# Patient Record
Sex: MALE | Race: WHITE | URBAN - METROPOLITAN AREA
[De-identification: names, ages, dates, MRNs, and addresses within clinical notes are randomized per-mention and may not be internally consistent; named-entity substitution may affect disease eponyms.]

---

## 2020-01-14 ENCOUNTER — EMERGENCY (EMERGENCY)
Facility: HOSPITAL | Age: 65
LOS: 0 days | Discharge: HOME | End: 2020-01-14
Attending: HOSPITALIST
Payer: COMMERCIAL

## 2020-01-14 VITALS
TEMPERATURE: 98 F | SYSTOLIC BLOOD PRESSURE: 162 MMHG | DIASTOLIC BLOOD PRESSURE: 79 MMHG | HEART RATE: 105 BPM | OXYGEN SATURATION: 100 % | WEIGHT: 220.02 LBS | RESPIRATION RATE: 16 BRPM
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
## 2020-01-14 DIAGNOSIS — R51 HEADACHE: ICD-10-CM

## 2020-01-14 DIAGNOSIS — S16.1XXA STRAIN OF MUSCLE, FASCIA AND TENDON AT NECK LEVEL, INITIAL ENCOUNTER: ICD-10-CM

## 2020-01-14 DIAGNOSIS — Z79.82 LONG TERM (CURRENT) USE OF ASPIRIN: ICD-10-CM

## 2020-01-14 DIAGNOSIS — M54.2 CERVICALGIA: ICD-10-CM

## 2020-01-14 PROCEDURE — 70450 CT HEAD/BRAIN W/O DYE: CPT

## 2020-01-14 PROCEDURE — 72125 CT NECK SPINE W/O DYE: CPT | Mod: 26

## 2020-01-14 PROCEDURE — 99283 EMERGENCY DEPT VISIT LOW MDM: CPT

## 2020-01-14 PROCEDURE — 70450 CT HEAD/BRAIN W/O DYE: CPT | Mod: 26

## 2020-01-14 PROCEDURE — 99284 EMERGENCY DEPT VISIT MOD MDM: CPT | Mod: 25

## 2020-01-14 PROCEDURE — 72125 CT NECK SPINE W/O DYE: CPT

## 2020-01-14 RX ORDER — ACETAMINOPHEN 500 MG
650 TABLET ORAL ONCE
Refills: 0 | Status: COMPLETED | OUTPATIENT
Start: 2020-01-14 | End: 2020-01-14

## 2020-01-14 RX ADMIN — Medication 650 MILLIGRAM(S): at 16:36
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
## 2020-01-14 NOTE — ED STATDOCS - NS_ ATTENDINGSCRIBEDETAILS _ED_A_ED_FT
Brynn Lock MD: The history, relevant review of systems, past medical and surgical history, medical decision making, and physical examination was documented by the scribe in my presence and I attest to the accuracy of the documentation.
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
## 2020-01-14 NOTE — ED STATDOCS - PATIENT PORTAL LINK FT
You can access the FollowMyHealth Patient Portal offered by [facility] by registering at the following website: [email or web address]. By joining [employer]’s FollowMyHealth portal, you will also be able to view your health information using other applications (apps) compatible with our system.

## 2023-07-30 ENCOUNTER — EMERGENCY (EMERGENCY)
Facility: HOSPITAL | Age: 68
LOS: 0 days | Discharge: HOME | End: 2023-07-30
Attending: EMERGENCY MEDICINE
Payer: MEDICARE

## 2023-07-30 VITALS — HEIGHT: 68 IN | WEIGHT: 160.06 LBS

## 2023-07-30 VITALS
SYSTOLIC BLOOD PRESSURE: 127 MMHG | OXYGEN SATURATION: 97 % | RESPIRATION RATE: 18 BRPM | DIASTOLIC BLOOD PRESSURE: 93 MMHG | TEMPERATURE: 98 F | HEART RATE: 65 BPM

## 2023-07-30 DIAGNOSIS — R07.89 OTHER CHEST PAIN: ICD-10-CM

## 2023-07-30 DIAGNOSIS — Z79.82 LONG TERM (CURRENT) USE OF ASPIRIN: ICD-10-CM

## 2023-07-30 DIAGNOSIS — I25.2 OLD MYOCARDIAL INFARCTION: ICD-10-CM

## 2023-07-30 DIAGNOSIS — I25.10 ATHEROSCLEROTIC HEART DISEASE OF NATIVE CORONARY ARTERY WITHOUT ANGINA PECTORIS: ICD-10-CM

## 2023-07-30 DIAGNOSIS — Z95.5 PRESENCE OF CORONARY ANGIOPLASTY IMPLANT AND GRAFT: ICD-10-CM

## 2023-07-30 PROBLEM — Z78.9 OTHER SPECIFIED HEALTH STATUS: Chronic | Status: ACTIVE | Noted: 2020-01-24

## 2023-07-30 LAB
ALBUMIN SERPL ELPH-MCNC: 3.8 G/DL — SIGNIFICANT CHANGE UP (ref 3.3–5)
ALP SERPL-CCNC: 61 U/L — SIGNIFICANT CHANGE UP (ref 40–120)
ALT FLD-CCNC: 27 U/L — SIGNIFICANT CHANGE UP (ref 12–78)
ANION GAP SERPL CALC-SCNC: 4 MMOL/L — LOW (ref 5–17)
AST SERPL-CCNC: 21 U/L — SIGNIFICANT CHANGE UP (ref 15–37)
BASOPHILS # BLD AUTO: 0.06 K/UL — SIGNIFICANT CHANGE UP (ref 0–0.2)
BASOPHILS NFR BLD AUTO: 0.5 % — SIGNIFICANT CHANGE UP (ref 0–2)
BILIRUB SERPL-MCNC: 0.8 MG/DL — SIGNIFICANT CHANGE UP (ref 0.2–1.2)
BUN SERPL-MCNC: 14 MG/DL — SIGNIFICANT CHANGE UP (ref 7–23)
CALCIUM SERPL-MCNC: 9.1 MG/DL — SIGNIFICANT CHANGE UP (ref 8.5–10.1)
CHLORIDE SERPL-SCNC: 105 MMOL/L — SIGNIFICANT CHANGE UP (ref 96–108)
CO2 SERPL-SCNC: 29 MMOL/L — SIGNIFICANT CHANGE UP (ref 22–31)
CREAT SERPL-MCNC: 1.17 MG/DL — SIGNIFICANT CHANGE UP (ref 0.5–1.3)
EGFR: 67 ML/MIN/1.73M2 — SIGNIFICANT CHANGE UP
EOSINOPHIL # BLD AUTO: 0.09 K/UL — SIGNIFICANT CHANGE UP (ref 0–0.5)
EOSINOPHIL NFR BLD AUTO: 0.8 % — SIGNIFICANT CHANGE UP (ref 0–6)
GLUCOSE SERPL-MCNC: 175 MG/DL — HIGH (ref 70–99)
HCT VFR BLD CALC: 41.9 % — SIGNIFICANT CHANGE UP (ref 39–50)
HGB BLD-MCNC: 14.1 G/DL — SIGNIFICANT CHANGE UP (ref 13–17)
IMM GRANULOCYTES NFR BLD AUTO: 0.3 % — SIGNIFICANT CHANGE UP (ref 0–0.9)
LYMPHOCYTES # BLD AUTO: 3.7 K/UL — HIGH (ref 1–3.3)
LYMPHOCYTES # BLD AUTO: 31.3 % — SIGNIFICANT CHANGE UP (ref 13–44)
MCHC RBC-ENTMCNC: 31.5 PG — SIGNIFICANT CHANGE UP (ref 27–34)
MCHC RBC-ENTMCNC: 33.7 GM/DL — SIGNIFICANT CHANGE UP (ref 32–36)
MCV RBC AUTO: 93.5 FL — SIGNIFICANT CHANGE UP (ref 80–100)
MONOCYTES # BLD AUTO: 0.98 K/UL — HIGH (ref 0–0.9)
MONOCYTES NFR BLD AUTO: 8.3 % — SIGNIFICANT CHANGE UP (ref 2–14)
NEUTROPHILS # BLD AUTO: 6.97 K/UL — SIGNIFICANT CHANGE UP (ref 1.8–7.4)
NEUTROPHILS NFR BLD AUTO: 58.8 % — SIGNIFICANT CHANGE UP (ref 43–77)
PLATELET # BLD AUTO: 392 K/UL — SIGNIFICANT CHANGE UP (ref 150–400)
POTASSIUM SERPL-MCNC: 4 MMOL/L — SIGNIFICANT CHANGE UP (ref 3.5–5.3)
POTASSIUM SERPL-SCNC: 4 MMOL/L — SIGNIFICANT CHANGE UP (ref 3.5–5.3)
PROT SERPL-MCNC: 7.9 GM/DL — SIGNIFICANT CHANGE UP (ref 6–8.3)
RBC # BLD: 4.48 M/UL — SIGNIFICANT CHANGE UP (ref 4.2–5.8)
RBC # FLD: 13.9 % — SIGNIFICANT CHANGE UP (ref 10.3–14.5)
SODIUM SERPL-SCNC: 138 MMOL/L — SIGNIFICANT CHANGE UP (ref 135–145)
TROPONIN I, HIGH SENSITIVITY RESULT: 4.08 NG/L — SIGNIFICANT CHANGE UP
TROPONIN I, HIGH SENSITIVITY RESULT: 4.3 NG/L — SIGNIFICANT CHANGE UP
WBC # BLD: 11.83 K/UL — HIGH (ref 3.8–10.5)
WBC # FLD AUTO: 11.83 K/UL — HIGH (ref 3.8–10.5)

## 2023-07-30 PROCEDURE — 93010 ELECTROCARDIOGRAM REPORT: CPT

## 2023-07-30 PROCEDURE — 99285 EMERGENCY DEPT VISIT HI MDM: CPT | Mod: 25

## 2023-07-30 PROCEDURE — 71045 X-RAY EXAM CHEST 1 VIEW: CPT | Mod: 26

## 2023-07-30 PROCEDURE — 36415 COLL VENOUS BLD VENIPUNCTURE: CPT

## 2023-07-30 PROCEDURE — 80053 COMPREHEN METABOLIC PANEL: CPT

## 2023-07-30 PROCEDURE — 93005 ELECTROCARDIOGRAM TRACING: CPT

## 2023-07-30 PROCEDURE — 71045 X-RAY EXAM CHEST 1 VIEW: CPT

## 2023-07-30 PROCEDURE — 85025 COMPLETE CBC W/AUTO DIFF WBC: CPT

## 2023-07-30 PROCEDURE — 84484 ASSAY OF TROPONIN QUANT: CPT

## 2023-07-30 PROCEDURE — 99285 EMERGENCY DEPT VISIT HI MDM: CPT | Mod: FS
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
## 2023-07-30 NOTE — ED ADULT NURSE NOTE - NSFALLUNIVINTERV_ED_ALL_ED
Bed/Stretcher in lowest position, wheels locked, appropriate side rails in place/Call bell, personal items and telephone in reach/Instruct patient to call for assistance before getting out of bed/chair/stretcher/Non-slip footwear applied when patient is off stretcher/[place] to call system/Physically safe environment - no spills, clutter or unnecessary equipment/Purposeful proactive rounding/Room/bathroom lighting operational, light cord in reach

## 2023-07-30 NOTE — ED STATDOCS - NSICDXPASTMEDICALHX_GEN_ALL_CORE_FT
PAST MEDICAL HISTORY:  No pertinent past medical history       CAD (coronary artery disease)     S/P coronary artery stent placement

## 2023-07-30 NOTE — ED ADULT TRIAGE NOTE - CHIEF COMPLAINT QUOTE
Pt presented to the ER with c/o chest pain. Pt stated that he had it on and off for the past week. Yesterday pt was walking at the track and it was the worst. Pt has a hx of cardiac stents.

## 2023-07-30 NOTE — ED STATDOCS - ATTENDING APP SHARED VISIT CONTRIBUTION OF CARE
I,Ghulam Mccormick MD,  performed the initial face to face bedside interview with this patient regarding history of present illness, review of symptoms and relevant past medical, social and family history.  I completed an independent physical examination.  I was the initial provider who evaluated this patient. I have signed out the follow up of any pending tests (i.e. labs, radiological studies) to the ACP.  I have communicated the patient’s plan of care and disposition with the ACP.  The history, relevant review of systems, past medical and surgical history, medical decision making, and physical examination was documented by the scribe in my presence and I attest to the accuracy of the documentation.

## 2023-07-30 NOTE — ED STATDOCS - PROGRESS NOTE DETAILS
69 y/o M with PMH of CAD s/p stent x3 presents with 1 week of intermittent chest tightness, worse with exertion, but subsides after apx 1 minute. Pt states he exercises regullarly. has not had cardiac event in 10 years. Denies recent travel. PE: Well appearing. Cardiac: s1s2, RRR. Lungs: CTAB. Abdomen: NBS x4, soft, nontender. PV: NO LE edema, calf tenderness. A/P: r/o ACS. Plan for labs, EKG, CXR, reassess. - Vince Nguyen PA-C

## 2023-07-30 NOTE — ED ADULT NURSE NOTE - OBJECTIVE STATEMENT
presents to ed with chest pain, patient states he had an episode of chest pain one week ago and one episode yesterday. denies chest pain at this time but states he feels something that he cant explain. denies pressure, denies shortness of breath. denies long travel recently. has history of cardiac stents 10 years ago. saw dr. burns last week for general check up. denies other symptoms such as fever

## 2023-07-30 NOTE — ED STATDOCS - PHYSICAL EXAMINATION
Gen: Awake, Alert, WD, WN, NAD  Head:  NC/AT  Eyes:  PERRL, EOMI, Conjunctiva pink, lids normal, no scleral icterus  ENT: OP clear, no exudates, no erythema, uvula midline, TMs clear bilaterally, moist mucus membranes  Neck: supple, nontender, no meningismus, no JVD, trachea midline  Cardiac/CV:  S1 S2, RRR, no M/G/R  Chest: nontender, no crepitus  Respiratory/Pulm:  CTAB, good air movement, normal resp effort, no wheezes/stridor/retractions/rales/rhonchi  Gastrointestinal/Abdomen:  Soft, nontender, nondistended, +BS, no rebound/guarding  Pelvis: stable, nontender, Hips: FROM, nontender  Back:  no CVAT, no MLT  Ext:  warm, well perfused, moving all extremities spontaneously, no cyanosis, no erythema, no edema, distal pulses intact  Skin: intact, no rash, no vesicles, no petechiae, no ecchymosis  Neuro:  AAOx3, sensation intact, motor 5/5 x 4 extremities, normal gait, speech clear

## 2023-07-30 NOTE — ED STATDOCS - PATIENT PORTAL LINK FT
You can access the FollowMyHealth Patient Portal offered by [facility] by registering at the following website: [email or web address]. By joining [employer]’s FollowMyHealth portal, you will also be able to view your health information using other applications (apps) compatible with our system.
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
## 2023-07-30 NOTE — ED STATDOCS - CARE PROVIDER_API CALL
Isaac James  Cardiovascular Disease  [address] [place] [address]  Phone: [phone]  Fax: [phone]  Follow Up Time:

## 2025-05-06 RX ORDER — DOXYCYCLINE HYCLATE 100 MG
1 TABLET ORAL
Qty: 0 | Refills: 0 | DISCHARGE
Start: 2025-05-06 | End: 2025-05-15

## 2025-05-11 ENCOUNTER — INPATIENT (INPATIENT)
Facility: HOSPITAL | Age: 70
LOS: 1 days | Discharge: HOME | DRG: 311 | End: 2025-05-13
Attending: HOSPITALIST | Admitting: STUDENT IN AN ORGANIZED HEALTH CARE EDUCATION/TRAINING PROGRAM
Payer: MEDICARE

## 2025-05-11 VITALS — WEIGHT: 214.95 LBS | HEIGHT: 71 IN

## 2025-05-11 DIAGNOSIS — T82.855A STENOSIS OF CORONARY ARTERY STENT, INITIAL ENCOUNTER: ICD-10-CM

## 2025-05-11 DIAGNOSIS — A69.20 LYME DISEASE, UNSPECIFIED: ICD-10-CM

## 2025-05-11 DIAGNOSIS — E78.5 HYPERLIPIDEMIA, UNSPECIFIED: ICD-10-CM

## 2025-05-11 DIAGNOSIS — I25.119 ATHEROSCLEROTIC HEART DISEASE OF NATIVE CORONARY ARTERY WITH UNSPECIFIED ANGINA PECTORIS: ICD-10-CM

## 2025-05-11 DIAGNOSIS — I24.9 ACUTE ISCHEMIC HEART DISEASE, UNSPECIFIED: ICD-10-CM

## 2025-05-11 DIAGNOSIS — I21.4 NON-ST ELEVATION (NSTEMI) MYOCARDIAL INFARCTION: ICD-10-CM

## 2025-05-11 DIAGNOSIS — Z79.02 LONG TERM (CURRENT) USE OF ANTITHROMBOTICS/ANTIPLATELETS: ICD-10-CM

## 2025-05-11 DIAGNOSIS — I10 ESSENTIAL (PRIMARY) HYPERTENSION: ICD-10-CM

## 2025-05-11 DIAGNOSIS — R73.03 PREDIABETES: ICD-10-CM

## 2025-05-11 DIAGNOSIS — Z95.5 PRESENCE OF CORONARY ANGIOPLASTY IMPLANT AND GRAFT: ICD-10-CM

## 2025-05-11 PROBLEM — I25.10 ATHEROSCLEROTIC HEART DISEASE OF NATIVE CORONARY ARTERY WITHOUT ANGINA PECTORIS: Chronic | Status: ACTIVE | Noted: 2023-07-30

## 2025-05-11 LAB
ALBUMIN SERPL ELPH-MCNC: 4.2 G/DL — SIGNIFICANT CHANGE UP (ref 3.3–5)
ALP SERPL-CCNC: 58 U/L — SIGNIFICANT CHANGE UP (ref 40–120)
ALT FLD-CCNC: 42 U/L — SIGNIFICANT CHANGE UP (ref 12–78)
ANION GAP SERPL CALC-SCNC: 3 MMOL/L — LOW (ref 5–17)
APPEARANCE UR: CLEAR — SIGNIFICANT CHANGE UP
APTT BLD: 29.1 SEC — SIGNIFICANT CHANGE UP (ref 26.1–36.8)
AST SERPL-CCNC: 38 U/L — HIGH (ref 15–37)
BASOPHILS # BLD AUTO: 0.05 K/UL — SIGNIFICANT CHANGE UP (ref 0–0.2)
BASOPHILS NFR BLD AUTO: 0.5 % — SIGNIFICANT CHANGE UP (ref 0–2)
BILIRUB SERPL-MCNC: 0.9 MG/DL — SIGNIFICANT CHANGE UP (ref 0.2–1.2)
BILIRUB UR-MCNC: NEGATIVE — SIGNIFICANT CHANGE UP
BLD GP AB SCN SERPL QL: SIGNIFICANT CHANGE UP
BUN SERPL-MCNC: 15 MG/DL — SIGNIFICANT CHANGE UP (ref 7–23)
CALCIUM SERPL-MCNC: 9.6 MG/DL — SIGNIFICANT CHANGE UP (ref 8.5–10.1)
CHLORIDE SERPL-SCNC: 104 MMOL/L — SIGNIFICANT CHANGE UP (ref 96–108)
CO2 SERPL-SCNC: 29 MMOL/L — SIGNIFICANT CHANGE UP (ref 22–31)
COLOR SPEC: YELLOW — SIGNIFICANT CHANGE UP
CREAT SERPL-MCNC: 1.2 MG/DL — SIGNIFICANT CHANGE UP (ref 0.5–1.3)
DIFF PNL FLD: NEGATIVE — SIGNIFICANT CHANGE UP
EGFR: 65 ML/MIN/1.73M2 — SIGNIFICANT CHANGE UP
EGFR: 65 ML/MIN/1.73M2 — SIGNIFICANT CHANGE UP
EOSINOPHIL # BLD AUTO: 0.27 K/UL — SIGNIFICANT CHANGE UP (ref 0–0.5)
EOSINOPHIL NFR BLD AUTO: 2.7 % — SIGNIFICANT CHANGE UP (ref 0–6)
GLUCOSE SERPL-MCNC: 146 MG/DL — HIGH (ref 70–99)
GLUCOSE UR QL: NEGATIVE MG/DL — SIGNIFICANT CHANGE UP
HCT VFR BLD CALC: 44.2 % — SIGNIFICANT CHANGE UP (ref 39–50)
HGB BLD-MCNC: 14.4 G/DL — SIGNIFICANT CHANGE UP (ref 13–17)
IMM GRANULOCYTES # BLD AUTO: 0.01 K/UL — SIGNIFICANT CHANGE UP (ref 0–0.07)
IMM GRANULOCYTES NFR BLD AUTO: 0.1 % — SIGNIFICANT CHANGE UP (ref 0–0.9)
INR BLD: 1.01 RATIO — SIGNIFICANT CHANGE UP (ref 0.85–1.16)
KETONES UR-MCNC: NEGATIVE MG/DL — SIGNIFICANT CHANGE UP
LEUKOCYTE ESTERASE UR-ACNC: NEGATIVE — SIGNIFICANT CHANGE UP
LYMPHOCYTES # BLD AUTO: 4.91 K/UL — HIGH (ref 1–3.3)
LYMPHOCYTES NFR BLD AUTO: 48.9 % — HIGH (ref 13–44)
MCHC RBC-ENTMCNC: 30.3 PG — SIGNIFICANT CHANGE UP (ref 27–34)
MCHC RBC-ENTMCNC: 32.6 G/DL — SIGNIFICANT CHANGE UP (ref 32–36)
MCV RBC AUTO: 93.1 FL — SIGNIFICANT CHANGE UP (ref 80–100)
MONOCYTES # BLD AUTO: 0.91 K/UL — HIGH (ref 0–0.9)
MONOCYTES NFR BLD AUTO: 9.1 % — SIGNIFICANT CHANGE UP (ref 2–14)
NEUTROPHILS # BLD AUTO: 3.9 K/UL — SIGNIFICANT CHANGE UP (ref 1.8–7.4)
NEUTROPHILS NFR BLD AUTO: 38.7 % — LOW (ref 43–77)
NITRITE UR-MCNC: NEGATIVE — SIGNIFICANT CHANGE UP
NRBC # BLD AUTO: 0 K/UL — SIGNIFICANT CHANGE UP (ref 0–0)
NRBC # FLD: 0 K/UL — SIGNIFICANT CHANGE UP (ref 0–0)
NRBC BLD AUTO-RTO: 0 /100 WBCS — SIGNIFICANT CHANGE UP (ref 0–0)
PH UR: 7 — SIGNIFICANT CHANGE UP (ref 5–8)
PLATELET # BLD AUTO: 331 K/UL — SIGNIFICANT CHANGE UP (ref 150–400)
PMV BLD: 11.1 FL — SIGNIFICANT CHANGE UP (ref 7–13)
POTASSIUM SERPL-MCNC: 4.8 MMOL/L — SIGNIFICANT CHANGE UP (ref 3.5–5.3)
POTASSIUM SERPL-SCNC: 4.8 MMOL/L — SIGNIFICANT CHANGE UP (ref 3.5–5.3)
PROT SERPL-MCNC: 7.9 GM/DL — SIGNIFICANT CHANGE UP (ref 6–8.3)
PROT UR-MCNC: SIGNIFICANT CHANGE UP MG/DL
PROTHROM AB SERPL-ACNC: 11.6 SEC — SIGNIFICANT CHANGE UP (ref 9.9–13.4)
RBC # BLD: 4.75 M/UL — SIGNIFICANT CHANGE UP (ref 4.2–5.8)
RBC # FLD: 14.7 % — HIGH (ref 10.3–14.5)
SODIUM SERPL-SCNC: 136 MMOL/L — SIGNIFICANT CHANGE UP (ref 135–145)
SP GR SPEC: 1.01 — SIGNIFICANT CHANGE UP (ref 1–1.03)
TROPONIN I, HIGH SENSITIVITY RESULT: 15.6 NG/L — SIGNIFICANT CHANGE UP
TROPONIN I, HIGH SENSITIVITY RESULT: 276.07 NG/L — HIGH
TROPONIN I, HIGH SENSITIVITY RESULT: 278.29 NG/L — HIGH
TROPONIN I, HIGH SENSITIVITY RESULT: 97.55 NG/L — HIGH
UROBILINOGEN FLD QL: 1 MG/DL — SIGNIFICANT CHANGE UP (ref 0.2–1)
WBC # BLD: 10.05 K/UL — SIGNIFICANT CHANGE UP (ref 3.8–10.5)
WBC # FLD AUTO: 10.05 K/UL — SIGNIFICANT CHANGE UP (ref 3.8–10.5)

## 2025-05-11 PROCEDURE — 99291 CRITICAL CARE FIRST HOUR: CPT | Mod: FS

## 2025-05-11 PROCEDURE — 93306 TTE W/DOPPLER COMPLETE: CPT

## 2025-05-11 PROCEDURE — 36415 COLL VENOUS BLD VENIPUNCTURE: CPT

## 2025-05-11 PROCEDURE — 84484 ASSAY OF TROPONIN QUANT: CPT

## 2025-05-11 PROCEDURE — 93005 ELECTROCARDIOGRAM TRACING: CPT

## 2025-05-11 PROCEDURE — C1894: CPT

## 2025-05-11 PROCEDURE — 85027 COMPLETE CBC AUTOMATED: CPT

## 2025-05-11 PROCEDURE — 93010 ELECTROCARDIOGRAM REPORT: CPT

## 2025-05-11 PROCEDURE — 99222 1ST HOSP IP/OBS MODERATE 55: CPT

## 2025-05-11 PROCEDURE — C1769: CPT

## 2025-05-11 PROCEDURE — 93458 L HRT ARTERY/VENTRICLE ANGIO: CPT

## 2025-05-11 PROCEDURE — 80048 BASIC METABOLIC PNL TOTAL CA: CPT

## 2025-05-11 PROCEDURE — 0523T NTRAPX C FFR W/3D FUNCJL MAP: CPT

## 2025-05-11 PROCEDURE — 71046 X-RAY EXAM CHEST 2 VIEWS: CPT | Mod: 26

## 2025-05-11 PROCEDURE — C1887: CPT

## 2025-05-11 RX ORDER — HYPROMELLOSE 0.4 %
1 DROPS OPHTHALMIC (EYE)
Refills: 0 | DISCHARGE

## 2025-05-11 RX ORDER — SILDENAFIL 50 MG/1
2 TABLET, FILM COATED ORAL
Refills: 0 | DISCHARGE

## 2025-05-11 RX ORDER — ASPIRIN 325 MG
81 TABLET ORAL DAILY
Refills: 0 | Status: DISCONTINUED | OUTPATIENT
Start: 2025-05-11 | End: 2025-05-13

## 2025-05-11 RX ORDER — ACETAMINOPHEN 500 MG/5ML
650 LIQUID (ML) ORAL EVERY 6 HOURS
Refills: 0 | Status: DISCONTINUED | OUTPATIENT
Start: 2025-05-11 | End: 2025-05-13

## 2025-05-11 RX ORDER — AMLODIPINE BESYLATE 10 MG/1
1 TABLET ORAL
Refills: 0 | DISCHARGE

## 2025-05-11 RX ORDER — CLOPIDOGREL BISULFATE 75 MG/1
300 TABLET, FILM COATED ORAL ONCE
Refills: 0 | Status: COMPLETED | OUTPATIENT
Start: 2025-05-11 | End: 2025-05-11

## 2025-05-11 RX ORDER — MELATONIN 5 MG
3 TABLET ORAL AT BEDTIME
Refills: 0 | Status: DISCONTINUED | OUTPATIENT
Start: 2025-05-11 | End: 2025-05-13

## 2025-05-11 RX ORDER — DOXYCYCLINE HYCLATE 100 MG
100 TABLET ORAL EVERY 12 HOURS
Refills: 0 | Status: DISCONTINUED | OUTPATIENT
Start: 2025-05-11 | End: 2025-05-13

## 2025-05-11 RX ORDER — B1/B2/B3/B5/B6/B12/VIT C/FOLIC 500-0.5 MG
1 TABLET ORAL
Refills: 0 | DISCHARGE

## 2025-05-11 RX ORDER — ROSUVASTATIN CALCIUM 20 MG/1
20 TABLET, FILM COATED ORAL AT BEDTIME
Refills: 0 | Status: DISCONTINUED | OUTPATIENT
Start: 2025-05-11 | End: 2025-05-11

## 2025-05-11 RX ORDER — MAGNESIUM, ALUMINUM HYDROXIDE 200-200 MG
30 TABLET,CHEWABLE ORAL EVERY 4 HOURS
Refills: 0 | Status: DISCONTINUED | OUTPATIENT
Start: 2025-05-11 | End: 2025-05-13

## 2025-05-11 RX ORDER — CYCLOBENZAPRINE HYDROCHLORIDE 15 MG/1
1 CAPSULE, EXTENDED RELEASE ORAL
Refills: 0 | DISCHARGE

## 2025-05-11 RX ORDER — ROSUVASTATIN CALCIUM 20 MG/1
20 TABLET, FILM COATED ORAL AT BEDTIME
Refills: 0 | Status: DISCONTINUED | OUTPATIENT
Start: 2025-05-11 | End: 2025-05-13

## 2025-05-11 RX ORDER — ACETAMINOPHEN 500 MG/5ML
650 LIQUID (ML) ORAL ONCE
Refills: 0 | Status: DISCONTINUED | OUTPATIENT
Start: 2025-05-11 | End: 2025-05-13

## 2025-05-11 RX ORDER — ASPIRIN 325 MG
162 TABLET ORAL ONCE
Refills: 0 | Status: COMPLETED | OUTPATIENT
Start: 2025-05-11 | End: 2025-05-11

## 2025-05-11 RX ORDER — HYPROMELLOSE 0.4 %
1 DROPS OPHTHALMIC (EYE) DAILY
Refills: 0 | Status: DISCONTINUED | OUTPATIENT
Start: 2025-05-11 | End: 2025-05-13

## 2025-05-11 RX ORDER — ASPIRIN 325 MG
325 TABLET ORAL ONCE
Refills: 0 | Status: COMPLETED | OUTPATIENT
Start: 2025-05-11 | End: 2025-05-11

## 2025-05-11 RX ORDER — LISINOPRIL 30 MG/1
100 TABLET ORAL DAILY
Refills: 0 | Status: DISCONTINUED | OUTPATIENT
Start: 2025-05-11 | End: 2025-05-13

## 2025-05-11 RX ORDER — AMLODIPINE BESYLATE 10 MG/1
10 TABLET ORAL DAILY
Refills: 0 | Status: DISCONTINUED | OUTPATIENT
Start: 2025-05-11 | End: 2025-05-13

## 2025-05-11 RX ORDER — ROSUVASTATIN CALCIUM 20 MG/1
1 TABLET, FILM COATED ORAL
Refills: 0 | DISCHARGE

## 2025-05-11 RX ORDER — ENOXAPARIN SODIUM 100 MG/ML
40 INJECTION SUBCUTANEOUS EVERY 24 HOURS
Refills: 0 | Status: DISCONTINUED | OUTPATIENT
Start: 2025-05-11 | End: 2025-05-13

## 2025-05-11 RX ORDER — LISINOPRIL 30 MG/1
1 TABLET ORAL
Refills: 0 | DISCHARGE

## 2025-05-11 RX ORDER — ONDANSETRON HCL/PF 4 MG/2 ML
4 VIAL (ML) INJECTION EVERY 8 HOURS
Refills: 0 | Status: DISCONTINUED | OUTPATIENT
Start: 2025-05-11 | End: 2025-05-13

## 2025-05-11 RX ORDER — ROSUVASTATIN CALCIUM 20 MG/1
20 TABLET, FILM COATED ORAL ONCE
Refills: 0 | Status: COMPLETED | OUTPATIENT
Start: 2025-05-11 | End: 2025-05-11

## 2025-05-11 RX ORDER — MELATONIN 5 MG
5 TABLET ORAL ONCE
Refills: 0 | Status: DISCONTINUED | OUTPATIENT
Start: 2025-05-11 | End: 2025-05-13

## 2025-05-11 RX ORDER — OMEGA-3-ACID ETHYL ESTERS CAPSULES 1 G/1
1 CAPSULE, LIQUID FILLED ORAL
Refills: 0 | DISCHARGE

## 2025-05-11 RX ADMIN — Medication 3 MILLIGRAM(S): at 22:12

## 2025-05-11 RX ADMIN — ROSUVASTATIN CALCIUM 20 MILLIGRAM(S): 20 TABLET, FILM COATED ORAL at 18:06

## 2025-05-11 RX ADMIN — ROSUVASTATIN CALCIUM 20 MILLIGRAM(S): 20 TABLET, FILM COATED ORAL at 16:50

## 2025-05-11 RX ADMIN — CLOPIDOGREL BISULFATE 300 MILLIGRAM(S): 75 TABLET, FILM COATED ORAL at 22:11

## 2025-05-11 RX ADMIN — Medication 40 MILLIGRAM(S): at 16:50

## 2025-05-11 RX ADMIN — Medication 162 MILLIGRAM(S): at 11:34

## 2025-05-11 NOTE — ED STATDOCS - CLINICAL SUMMARY MEDICAL DECISION MAKING FREE TEXT BOX
70 year old male with PMHx of CAD (stents x9 last 3/2025 - on baby aspirin) and pre-DM presenting to the ED with spouse c/o chest discomfort and numbness. Plan for cardiac monitor, labs, EKG, CXR, and cardiology consult.

## 2025-05-11 NOTE — H&P ADULT - NSHPLABSRESULTS_GEN_ALL_CORE
14.4   10.05 )-----------( 331      ( 11 May 2025 11:20 )             44.2     05-11    136  |  104  |  15  ----------------------------<  146[H]  4.8   |  29  |  1.20    Ca    9.6      11 May 2025 11:20    TPro  7.9  /  Alb  4.2  /  TBili  0.9  /  DBili  x   /  AST  38[H]  /  ALT  42  /  AlkPhos  58  05-11

## 2025-05-11 NOTE — ED STATDOCS - CRITICAL CARE ATTENDING CONTRIBUTION TO CARE
Elements for critical care include direct patient care (not related to procedure), additional history taking, interpretation of diagnostic studies, documentation, consultation with other physicians,    I personally saw the patient.  ROCHELLE and I provided critical care for a total of X minutes; I provided the majority of the critical care time

## 2025-05-11 NOTE — ED STATDOCS - PROGRESS NOTE DETAILS
71 y/o M with PMH of CAd s/p stent x9 last 3/25 on ASA presents with chest discomfort this AM which has since subsided. Pt state he went for a 2 mile walk on track, also going up and down bleachers. Upon arrival at home he started using his leaf blower. Afterwards he states he felt pain across his chest which lasted for apx 1 hour then subsided. Denies SOB, nausea, vomiting, trauma, travel. Denies AC use. Cards: Dr. James & Dr. Warren. PE: Well appearing. Cardiac: s1s2, RRR. lungs: CTAB. Abdomen: NBS x4 soft, nontender. PV: NO LE edema, calf tenderness. A/P: CP, r/o ACS. HEART score: 6 with troponin pending. Plan for EKG, labs, CXR, admission. - Vince Nguyen PA-C Repeat troponin 97. Plan for admission. Dr. Evangelista as accepting. - Vince Nguyen PA-C

## 2025-05-11 NOTE — ED ADULT NURSE NOTE - OBJECTIVE STATEMENT
Patient presents to the ER with complaints of chest pain that has since resolved. Patient states he was exercising this am when he felt the pain, denies shortness of breath, dizziness, N/V. Patient drinks daily, non smoker, hx: stents on aspirin, recently stopped plavix.

## 2025-05-11 NOTE — H&P ADULT - HISTORY OF PRESENT ILLNESS
· HPI Objective Statement: 70 year old male with PMHx of CAD (stents x9 last 3/2025 - on baby aspirin) and pre-DM, recent diagnosis of lympe(on doxyxycline)71 y/o M PMHx presenting to the ED with spouse c/o chest discomfort and numbness since approximately one hour PTA radiating across chest and down both arms. Pt states he went walking/climbing steps this morning and when he returned home and was blowing leaves the symptoms started. Pt state she does these activities frequently. Pt states that the symptoms have improved since earlier. Pt denies shortness of breath, palpitations, diaphoresis, recent travel, or smoking history. Pt was recently taken off blood thinners. Pt has no known medical allergies. Pt follows with cardiology: Dr. James and Dr. Warren.

## 2025-05-11 NOTE — ED ADULT TRIAGE NOTE - CHIEF COMPLAINT QUOTE
pt presents to the ED for chest pain x 1 hour. denies SOB, palpitations. hx of 9 stents. no other complaints at this time. pt taken for STAT EKG.

## 2025-05-11 NOTE — ED STATDOCS - NSICDXPASTMEDICALHX_GEN_ALL_CORE_FT
PAST MEDICAL HISTORY:  CAD (coronary artery disease)     No pertinent past medical history     S/P coronary artery stent placement

## 2025-05-11 NOTE — CHART NOTE - NSCHARTNOTEFT_GEN_A_CORE
repeat ekg reviewed again shows q wave in avl not present on prior ekg,   discussed with nocturnist Dr Judd who will do repeat trops and revaluate patient, if trops higher plan to start heparin gtt and stat call to cardio
Trops tending up  15> (7> 276  ekg no acute changes  No active CP  Stat call to Dr Garrison made as she is covering for Dr James

## 2025-05-11 NOTE — ED STATDOCS - CONSULTANT FREE TEXT FOR MDM DISCUSSED CASE WITH QUESTION
Dr Garrison, cardiology attendign on call, agree with ED plan to admit and Cards will see pt during admission

## 2025-05-11 NOTE — PATIENT PROFILE ADULT - NSPRONUTRITIONRISK_GEN_A_NUR
Pt called and said she needs something different than the Norco that was prescribed due to her new glucose meter. No indicators present

## 2025-05-11 NOTE — ED ADULT NURSE NOTE - NSFALLUNIVINTERV_ED_ALL_ED
Bed/Stretcher in lowest position, wheels locked, appropriate side rails in place/Call bell, personal items and telephone in reach/Instruct patient to call for assistance before getting out of bed/chair/stretcher/Non-slip footwear applied when patient is off stretcher/[place] to call system/Physically safe environment - no spills, clutter or unnecessary equipment/Purposeful proactive rounding/Room/bathroom lighting operational, light cord in reach

## 2025-05-11 NOTE — H&P ADULT - NSHPREVIEWOFSYSTEMS_GEN_ALL_CORE
ROS:  General:  No fevers, chills, or unexplained weight loss  Skin: No rash or bothersome skin lesions  Musculoskeletal: No arthalgias, myalgias or joint swelling  Eyes: No visual changes or eye pain  Ears: No hearing loss , otorrhea or ear pain  Nose, Mouth, Throat: No nasal congestion, rhinorrhea, oral lesions, postnasal drip or sore throat  Cardio: No chest pain or palpitations. no lower extremity edema. no syncope. no claudication.   Respiratory: No cough, shortness of breath or wheezing   GI: No diarrhea, constipation, blood in stools, abdominal pain, vomiting or heartburn  : No urinary frequency, hematuria, incontinence, or dysuria  Neurologic: No headaches, parasthesias, confusion, dysarthria or gait instability  Psychiatric:  No anxiety or depression  Lymphatic:  No easy bruising, easy bleeding or swollen glands  Allergic: No itching, sneezing , watery eyes, clear rhinorrhea or recurrent infections

## 2025-05-11 NOTE — H&P ADULT - ASSESSMENT
69 y/o PMHx CAD(9 stents) HTN, HLD, recent lyme Dx who is being admitted for possible ACS    Patient went am jogging 2 hours (no symptoms cam home put blower back on his back and was blowing leaves when he experienced chest pressure and numbness, symtoms resolved by them selves in 45 minuts but patient came to ER . he has on been on lyme treat since 5/8/25 for lyme disease    last seen by his cardio 2 months ago      Initial ED trop 15>> 91  Ekg no acute changes  No active chest pain      #Chest pain:  -monitor on tele  -s/p asprin in ED  cardio consulted Dr Garrison covering will see am  -c/w home atenolol, Rosuvastatin  repeat 3rd trop and stat ekg      #CAD:  -on statin and asprin      #Lyme  -c/w doxycyline and Protonix    #HTN  -c/w amlodipine    CODE: FULL  Diet: Dash  DVT PPX: Lovenox     69 y/o PMHx CAD(9 stents) HTN, HLD, recent lyme Dx who is being admitted for possible ACS    Patient went am jogging 2 hours (no symptoms cam home put blower back on his back and was blowing leaves when he experienced chest pressure and numbness, symtoms resolved by them selves in 45 minuts but patient came to ER . he has on been on lyme treat since 5/8/25 for lyme disease    last seen by his cardio 2 months ago      Initial ED trop 15>> 91  Ekg no acute changes  No active chest pain      #Chest pain/ elevated troponins  -monitor on tele  -s/p asprin in ED  cardio consulted Dr Garrison covering will see am  -c/w home atenolol, Rosuvastatin  -repeat 3rd trop > 200  repeat ekg not acute changes  -Per converstaion with Dr Garrison no need to start heparin gtt  -have ordered nuclear stress per Dr Dunham      #CAD:  -on statin and asprin      #Lyme  -c/w doxycyline and Protonix    #HTN  -c/w amlodipine    CODE: FULL  Diet: Dash  DVT PPX: Lovenox

## 2025-05-11 NOTE — ED STATDOCS - OBJECTIVE STATEMENT
70 year old male with PMHx of CAD (stents x9 last 3/2025 - on baby aspirin) and pre-DM presenting to the ED with spouse c/o chest discomfort and numbness since approximately one hour PTA radiating across chest and down both arms. Pt states he went walking/climbing steps this morning and when he returned home and was blowing leaves the symptoms started. Pt state she does these activities frequently. Pt states that the symptoms have improved since earlier. Pt denies shortness of breath, palpitations, diaphoresis, recent travel, or smoking history. Pt was recently taken off blood thinners. Pt has no known medical allergies. Pt follows with cardiology: Dr. James and Dr. Warren.

## 2025-05-11 NOTE — H&P ADULT - NSHPPHYSICALEXAM_GEN_ALL_CORE
VITALS:  T(F): 97.1 (05-11-25 @ 10:46), Max: 97.1 (05-11-25 @ 10:46)  HR: 81 (05-11-25 @ 10:46) (81 - 81)  BP: 149/78 (05-11-25 @ 10:46) (149/78 - 149/78)  RR: 18 (05-11-25 @ 10:46) (18 - 18)  SpO2: 99% (05-11-25 @ 10:46) (99% - 99%)  Wt(kg): --    I&O's Summary      CAPILLARY BLOOD GLUCOSE          PHYSICAL EXAM:  Gen: NAD  HEENT:  pupils equal and reactive, EOMI, no oropharyngeal lesions, erythema, exudates, oral thrush  NECK:   supple, no carotid bruits, no palpable lymph nodes, no thyromegaly  CV:  +S1, +S2, regular, no murmurs or rubs  RESP:   lungs clear to auscultation bilaterally, no wheezing, rales, rhonchi, good air entry bilaterally  BREAST:  not examined  GI:  abdomen soft, non-tender, non-distended, normal BS, no bruits, no abdominal masses, no palpable masses  RECTAL:  not examined  :  not examined  MSK:   normal muscle tone, no atrophy, no rigidity, no contractions  EXT:  no clubbing, no cyanosis, no edema, no calf pain, swelling or erythema  VASCULAR:  pulses equal and symmetric in the upper and lower extremities  NEURO:  AAOX3, no focal neurological deficits, follows all commands, able to move extremities spontaneously  SKIN:  no ulcers, lesions or rashes

## 2025-05-12 ENCOUNTER — RESULT REVIEW (OUTPATIENT)
Age: 70
End: 2025-05-12

## 2025-05-12 LAB
ADD ON TEST-SPECIMEN IN LAB: SIGNIFICANT CHANGE UP
ANION GAP SERPL CALC-SCNC: 5 MMOL/L — SIGNIFICANT CHANGE UP (ref 5–17)
BUN SERPL-MCNC: 16 MG/DL — SIGNIFICANT CHANGE UP (ref 7–23)
CALCIUM SERPL-MCNC: 9.1 MG/DL — SIGNIFICANT CHANGE UP (ref 8.5–10.1)
CHLORIDE SERPL-SCNC: 109 MMOL/L — HIGH (ref 96–108)
CO2 SERPL-SCNC: 27 MMOL/L — SIGNIFICANT CHANGE UP (ref 22–31)
CREAT SERPL-MCNC: 1.04 MG/DL — SIGNIFICANT CHANGE UP (ref 0.5–1.3)
EGFR: 77 ML/MIN/1.73M2 — SIGNIFICANT CHANGE UP
EGFR: 77 ML/MIN/1.73M2 — SIGNIFICANT CHANGE UP
GLUCOSE SERPL-MCNC: 116 MG/DL — HIGH (ref 70–99)
HCT VFR BLD CALC: 40.2 % — SIGNIFICANT CHANGE UP (ref 39–50)
HGB BLD-MCNC: 13.6 G/DL — SIGNIFICANT CHANGE UP (ref 13–17)
MCHC RBC-ENTMCNC: 30.8 PG — SIGNIFICANT CHANGE UP (ref 27–34)
MCHC RBC-ENTMCNC: 33.8 G/DL — SIGNIFICANT CHANGE UP (ref 32–36)
MCV RBC AUTO: 91.2 FL — SIGNIFICANT CHANGE UP (ref 80–100)
NRBC # BLD AUTO: 0 K/UL — SIGNIFICANT CHANGE UP (ref 0–0)
NRBC # FLD: 0 K/UL — SIGNIFICANT CHANGE UP (ref 0–0)
NRBC BLD AUTO-RTO: 0 /100 WBCS — SIGNIFICANT CHANGE UP (ref 0–0)
PLATELET # BLD AUTO: 305 K/UL — SIGNIFICANT CHANGE UP (ref 150–400)
PMV BLD: 11 FL — SIGNIFICANT CHANGE UP (ref 7–13)
POTASSIUM SERPL-MCNC: 3.9 MMOL/L — SIGNIFICANT CHANGE UP (ref 3.5–5.3)
POTASSIUM SERPL-SCNC: 3.9 MMOL/L — SIGNIFICANT CHANGE UP (ref 3.5–5.3)
RBC # BLD: 4.41 M/UL — SIGNIFICANT CHANGE UP (ref 4.2–5.8)
RBC # FLD: 15 % — HIGH (ref 10.3–14.5)
SODIUM SERPL-SCNC: 141 MMOL/L — SIGNIFICANT CHANGE UP (ref 135–145)
TROPONIN I, HIGH SENSITIVITY RESULT: 145.31 NG/L — HIGH
WBC # BLD: 9.65 K/UL — SIGNIFICANT CHANGE UP (ref 3.8–10.5)
WBC # FLD AUTO: 9.65 K/UL — SIGNIFICANT CHANGE UP (ref 3.8–10.5)

## 2025-05-12 PROCEDURE — 99233 SBSQ HOSP IP/OBS HIGH 50: CPT

## 2025-05-12 PROCEDURE — 93306 TTE W/DOPPLER COMPLETE: CPT | Mod: 26

## 2025-05-12 RX ORDER — RANOLAZINE 1000 MG/1
500 TABLET, FILM COATED, EXTENDED RELEASE ORAL
Refills: 0 | Status: DISCONTINUED | OUTPATIENT
Start: 2025-05-12 | End: 2025-05-13

## 2025-05-12 RX ADMIN — Medication 100 MILLIGRAM(S): at 21:29

## 2025-05-12 RX ADMIN — Medication 200 MILLILITER(S): at 16:25

## 2025-05-12 RX ADMIN — LISINOPRIL 100 MILLIGRAM(S): 30 TABLET ORAL at 21:29

## 2025-05-12 RX ADMIN — Medication 81 MILLIGRAM(S): at 10:07

## 2025-05-12 RX ADMIN — ROSUVASTATIN CALCIUM 20 MILLIGRAM(S): 20 TABLET, FILM COATED ORAL at 21:29

## 2025-05-12 RX ADMIN — Medication 250 MILLILITER(S): at 13:53

## 2025-05-12 RX ADMIN — AMLODIPINE BESYLATE 10 MILLIGRAM(S): 10 TABLET ORAL at 10:07

## 2025-05-12 RX ADMIN — Medication 100 MILLIGRAM(S): at 10:07

## 2025-05-12 NOTE — PROGRESS NOTE ADULT - SUBJECTIVE AND OBJECTIVE BOX
Department of Cardiology                                                               Division of Interventional Cardiology                                                               [facility] /[address] [address]                                                                                 [phone]           Post Procedure Progress Note  Patient is a 70y old  Male who presents with a chief complaint of chest pain (12 May 2025 10:15)    ASA class: II  Creatinine: 1.04  GFR: 74  Bleeding  Risk score: 0.7  Ezekiel Score:  1    -procedure of cardiac catheterization w/ coronary angiogram and possible stent placement, with possible sedation and analgia explained. Pt is competent, has capacity, and understands risks and benefits of procedure. Risks and benefits discussed. Risks include but not limited to bleeding, infection, allergy, renal failure requiring dialysis, stroke, and vascular injury.  All questions answered   - consent to be obtained from attending   - NIKUNJ     Patient is  now s/p left heart catheterization    s/p LHC : patent stents, non obs disease   Pt denies chest pain/SOB/palpitations post cath.  PROCEDURE SITE: --RRA accessed.  hemoband to be removed 1 hour post placement --- Site is without hematoma or bleeding. Sensation and RASHAWN intact. Distal pulses palpable 2+, capillary refill < 2 seconds.       Vital Signs  T(C): 36.9 (05-12-25 @ 13:40), Max: 36.9 (05-12-25 @ 13:40)  HR: 86 (05-12-25 @ 13:40) (74 - 99)  BP: 149/75 (05-12-25 @ 13:40) (106/77 - 149/75)  RR: 15 (05-12-25 @ 13:40) (15 - 18)  SpO2: 95% (05-12-25 @ 13:40) (95% - 100%)  Wt(kg): --    Home Medications:  amLODIPine 10 mg oral tablet: 1 tab(s) orally once a day (11 May 2025 14:16)  aspirin 81 mg oral delayed release tablet: 1 tab(s) orally once a day (11 May 2025 14:16)  atenolol 100 mg oral tablet: 1 tab(s) orally once a day (11 May 2025 14:16)  cyclobenzaprine 10 mg oral tablet: 1 tab(s) orally once a day (at bedtime) as needed for  muscle spasm (11 May 2025 14:16)  doxycycline hyclate 100 mg oral tablet: 1 tab(s) orally 2 times a day x 7 days ***Course Not Complete*** (11 May 2025 14:16)  Fish Oil 1000 mg oral capsule: 1 cap(s) orally once a day (11 May 2025 14:16)  Multiple Vitamins oral tablet: 1 tab(s) orally once a day (11 May 2025 14:16)  rosuvastatin 20 mg oral tablet: 1 tab(s) orally once a day (11 May 2025 14:16)  sildenafil 20 mg oral tablet: 2 tab(s) orally as directed (11 May 2025 14:16)  Visine Tears ophthalmic solution: 1 drop(s) in each eye 3 times a day as needed for  dry eyes (11 May 2025 14:16)  zinc (as acetate) 50 mg oral capsule: 1 cap(s) orally once a day (11 May 2025 14:16)    MEDICATIONS  (STANDING):  amLODIPine   Tablet 10 milliGRAM(s) Oral daily  artificial  tears Solution 1 Drop(s) Both EYES daily  aspirin enteric coated 81 milliGRAM(s) Oral daily  atenolol  Tablet 100 milliGRAM(s) Oral daily  doxycycline monohydrate Capsule 100 milliGRAM(s) Oral every 12 hours  enoxaparin Injectable 40 milliGRAM(s) SubCutaneous every 24 hours  melatonin 5 milliGRAM(s) Oral once  pantoprazole    Tablet 40 milliGRAM(s) Oral before breakfast  ranolazine 500 milliGRAM(s) Oral two times a day  rosuvastatin 20 milliGRAM(s) Oral at bedtime  sodium chloride 0.9%. 1000 milliLiter(s) (200 mL/Hr) IV Continuous <Continuous>      PHYSICAL EXAM:  NEURO: Non-focal, AxOx3.  No neuro deficits   CHEST/LUNG: Clear to auscultation bilaterally; No wheeze  HEART: s1 s2 Regular rate and rhythm; No murmurs, rubs, or gallops  ABDOMEN: Soft, Nontender, Nondistended; Bowel sounds present X 4 quadrants   EXTREMITIES:  2+ Peripheral Pulses, No clubbing, cyanosis, or edema   VASCULAR: Peripheral pulses palpable 2+ bilaterally      PROCEDURE RESULTS:  full report to follow               PLAN:  -VS, diet, activity as per post cath orders  - IVF per NIKUNJ protocol   -Encourage PO fluids  -Continue current medications\  - start ranexa 500mg BID   -Post cath instructions reviewed, post sedation instructions reviewed; patient verbalizes and understands instructions  -Discussed therapeutic lifestyle changes to reduce risk factors such as following a cardiac diet, weight loss, maintaining a healthy weight, exercise, smoking cessation, medication compliance, and regular follow-up  with PCP/Cardioloigst  -Plan of care discussed with patient, RN and Dr. Warren  - rest of care per primary and general cardiology

## 2025-05-12 NOTE — PROGRESS NOTE ADULT - ASSESSMENT
70 year old man with pre-diabetes, CAD, s/p multiple PCIs, last 3/2025, recent tick bite, currently on doxycycline, presented for further evaluation of chest discomfort when climbing steps. In ED was noted to have troponin 15 initially, increased to 275. EKG with nonspecific ST T changes. CXR clear. Patient was triaged to [facility] for further evaluation.    NSTEMI  S/P [facility] today. Patent stents, non obstructive CAD. Continue medical management with aspirin, statin, atenolol, addition of Ranexa. Outpatient follow up with Cardiology.     HTN  BP controlled. Continue atenolol and amlodipine    Tick bite  Continue doxycycline as patient was doing prior to admission

## 2025-05-12 NOTE — PACU DISCHARGE NOTE - COMMENTS
Patient s/p LHC via Right Radial Artery. Gauze and tegaderm to RUE in place. No s/s of bleeding or hematoma. RUE warm and mobile. VS Stable. NS at 200 cc/hr x 4 hours post LHC.  Report given to CURTIS Schneider on 3E and confirmed remote telemetry  with SHAYLA Hena[employer] tech. Pending transport to  at this time

## 2025-05-12 NOTE — PROGRESS NOTE ADULT - SUBJECTIVE AND OBJECTIVE BOX
Chief Complaint: Chest pain    Interval Hx: Patient seen and examined this AM. Asymptomatic. No further chest pain or tightness. No dyspnea. No dizziness, lightheadedness or palpitations. No other complaints.     ROS: Multi system review is comprehensively negative x 10 systems    Vitals:  T(F): 98.5 (12 May 2025 13:40), Max: 98.5 (12 May 2025 13:40)  HR: 70 (12 May 2025 16:45) (67 - 99)  BP: 121/82 (12 May 2025 16:45) (106/77 - 149/75)  RR: 18 (12 May 2025 16:45) (15 - 18)  SpO2: 95% (12 May 2025 16:45) (95% - 100%) on room air    Exam:  Gen: No distress  HEENT: NCAT PERRL EOMI MMM clear oropharynx  Neck: Supple, no JVD, no LAD  CVS: s1 s2 normal, RRR  Chest: Normal resp effort, lungs CTA B/L  Abd: Non distended, +BS, soft, non tender  Ext: No edema, no tenderness, intact peripheral pulses  Skin: Warm, dry  Mood: Calm, pleasant  Neuro: A+OX3, no deficits    Labs:                       13.6   9.65  )----------( 305                   40.2       141  |  109  |  16  ----------------------<  116  3.9   |   27   |  1.04    Ca    9.1        TPro  7.9  /  Alb  4.2  /  TBili  0.9  /  DBili  x   /  AST  38[H]  /  ALT  42  /  AlkPhos  58      PT: 11.6 sec;   INR: 1.01 ratio    PTT: 29.1 sec    Troponin 15-->278-->145    UA negative    Imaging:  CXR 5/11: Suboptimal degree of inspiration with bronchovascular crowding. There is no focal consolidation, pleural effusion or pneumothorax. The cardiomediastinal silhouette is within normal limits. Osseous structures are within normal limits.    Cardiac Testing:  TTE 5/12: Left ventricular cavity is normal in size. Left ventricular wall thickness is normal. Leftventricular systolic function is normal with an ejection fraction of 56 % by Jeronimo's method of disks with an ejection fraction visually estimated at 55 to 60 %. Normal left ventricular diastolic function. Normal right ventricular cavity size and normal right ventricular systolic function. Normal left and right atrial size. Mild mitral regurgitation. Trace tricuspid regurgitation. Estimated pulmonary artery systolic pressure is 12 mmHg, consistent with normal pulmonary artery pressure. Trileaflet aortic valve with normal systolic excursion. There is mild thickening of the aortic valve leaflets.    EKG 5/11: Rate 82. NSR.     Meds:  MEDICATIONS  (STANDING):  amLODIPine   Tablet 10 milliGRAM(s) Oral daily  artificial  tears Solution 1 Drop(s) Both EYES daily  aspirin enteric coated 81 milliGRAM(s) Oral daily  atenolol  Tablet 100 milliGRAM(s) Oral daily  doxycycline monohydrate Capsule 100 milliGRAM(s) Oral every 12 hours  enoxaparin Injectable 40 milliGRAM(s) SubCutaneous every 24 hours  melatonin 5 milliGRAM(s) Oral once  pantoprazole    Tablet 40 milliGRAM(s) Oral before breakfast  ranolazine 500 milliGRAM(s) Oral two times a day  rosuvastatin 20 milliGRAM(s) Oral at bedtime  sodium chloride 0.9%. 1000 milliLiter(s) (200 mL/Hr) IV Continuous <Continuous>    MEDICATIONS  (PRN):  acetaminophen     Tablet .. 650 milliGRAM(s) Oral once PRN Mild Pain (1 - 3)  acetaminophen     Tablet .. 650 milliGRAM(s) Oral every 6 hours PRN Temp greater or equal to 38C (100.4F), Mild Pain (1 - 3)  aluminum hydroxide/magnesium hydroxide/simethicone Suspension 30 milliLiter(s) Oral every 4 hours PRN Dyspepsia  melatonin 3 milliGRAM(s) Oral at bedtime PRN Insomnia  ondansetron Injectable 4 milliGRAM(s) IV Push every 8 hours PRN Nausea and/or Vomiting                     Chief Complaint: Chest pain    Interval Hx: Patient seen and examined this AM. Asymptomatic. No further chest pain or tightness. No dyspnea. No dizziness, lightheadedness or palpitations. No other complaints.     ROS: Multi system review is comprehensively negative x 10 systems    Vitals:  T(F): 98.5 (12 May 2025 13:40), Max: 98.5 (12 May 2025 13:40)  HR: 70 (12 May 2025 16:45) (67 - 99)  BP: 121/82 (12 May 2025 16:45) (106/77 - 149/75)  RR: 18 (12 May 2025 16:45) (15 - 18)  SpO2: 95% (12 May 2025 16:45) (95% - 100%) on room air    Exam:  Gen: No distress  HEENT: NCAT PERRL EOMI MMM clear oropharynx  Neck: Supple, no JVD, no LAD  CVS: s1 s2 normal, RRR  Chest: Normal resp effort, lungs CTA B/L  Abd: Non distended, +BS, soft, non tender  Ext: No edema, no tenderness, intact peripheral pulses  Skin: Warm, dry  Mood: Calm, pleasant  Neuro: A+OX3, no deficits    Labs:                       13.6   9.65  )----------( 305                   40.2       141  |  109  |  16  ----------------------<  116  3.9   |   27   |  1.04    Ca    9.1        TPro  7.9  /  Alb  4.2  /  TBili  0.9  /  DBili  x   /  AST  38[H]  /  ALT  42  /  AlkPhos  58      PT: 11.6 sec;   INR: 1.01 ratio    PTT: 29.1 sec    Troponin 15-->278-->145    UA negative    Imaging:  CXR 5/11: Suboptimal degree of inspiration with bronchovascular crowding. There is no focal consolidation, pleural effusion or pneumothorax. The cardiomediastinal silhouette is within normal limits. Osseous structures are within normal limits.    Cardiac Testing:  C 5/12: Patent stents, non obstructive CAD    TTE 5/12: Left ventricular cavity is normal in size. Left ventricular wall thickness is normal. Leftventricular systolic function is normal with an ejection fraction of 56 % by Jeronimo's method of disks with an ejection fraction visually estimated at 55 to 60 %. Normal left ventricular diastolic function. Normal right ventricular cavity size and normal right ventricular systolic function. Normal left and right atrial size. Mild mitral regurgitation. Trace tricuspid regurgitation. Estimated pulmonary artery systolic pressure is 12 mmHg, consistent with normal pulmonary artery pressure. Trileaflet aortic valve with normal systolic excursion. There is mild thickening of the aortic valve leaflets.    EKG 5/11: Rate 82. NSR.     Meds:  MEDICATIONS  (STANDING):  amLODIPine   Tablet 10 milliGRAM(s) Oral daily  artificial  tears Solution 1 Drop(s) Both EYES daily  aspirin enteric coated 81 milliGRAM(s) Oral daily  atenolol  Tablet 100 milliGRAM(s) Oral daily  doxycycline monohydrate Capsule 100 milliGRAM(s) Oral every 12 hours  enoxaparin Injectable 40 milliGRAM(s) SubCutaneous every 24 hours  melatonin 5 milliGRAM(s) Oral once  pantoprazole    Tablet 40 milliGRAM(s) Oral before breakfast  ranolazine 500 milliGRAM(s) Oral two times a day  rosuvastatin 20 milliGRAM(s) Oral at bedtime  sodium chloride 0.9%. 1000 milliLiter(s) (200 mL/Hr) IV Continuous <Continuous>    MEDICATIONS  (PRN):  acetaminophen     Tablet .. 650 milliGRAM(s) Oral once PRN Mild Pain (1 - 3)  acetaminophen     Tablet .. 650 milliGRAM(s) Oral every 6 hours PRN Temp greater or equal to 38C (100.4F), Mild Pain (1 - 3)  aluminum hydroxide/magnesium hydroxide/simethicone Suspension 30 milliLiter(s) Oral every 4 hours PRN Dyspepsia  melatonin 3 milliGRAM(s) Oral at bedtime PRN Insomnia  ondansetron Injectable 4 milliGRAM(s) IV Push every 8 hours PRN Nausea and/or Vomiting

## 2025-05-12 NOTE — CONSULT NOTE ADULT - SUBJECTIVE AND OBJECTIVE BOX
NSTEMI    Cath today    Full note to follow.     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  Prior to that, the patient exercise, without any cardiac symptoms.  This episode occurred while he was doing work in his yard.  Cardiac enzymes since trended-now trending down.  On my evaluation, chest pain-free.  On telemetry, EKG shows sinus rhythm PVCs.    ________________________________  Review of systems: A 10 point review of system has been performed, and is negative except for what has been mentioned in the above history of present illness.     PAST MEDICAL & SURGICAL HISTORY:  No pertinent past medical history      CAD (coronary artery disease)      S/P coronary artery stent placement      No significant past surgical history        FAMILY HISTORY:       SOCIAL HISTORY: The patient denies any tobacco abuse, alcohol abuse or illicit drug use.    ALLERGIES:  penicillin (Unknown)    Home Medications:  amLODIPine 10 mg oral tablet: 1 tab(s) orally once a day (11 May 2025 14:16)  aspirin 81 mg oral delayed release tablet: 1 tab(s) orally once a day (11 May 2025 14:16)  atenolol 100 mg oral tablet: 1 tab(s) orally once a day (11 May 2025 14:16)  cyclobenzaprine 10 mg oral tablet: 1 tab(s) orally once a day (at bedtime) as needed for  muscle spasm (11 May 2025 14:16)  doxycycline hyclate 100 mg oral tablet: 1 tab(s) orally 2 times a day x 7 days ***Course Not Complete*** (11 May 2025 14:16)  Fish Oil 1000 mg oral capsule: 1 cap(s) orally once a day (11 May 2025 14:16)  Multiple Vitamins oral tablet: 1 tab(s) orally once a day (11 May 2025 14:16)  rosuvastatin 20 mg oral tablet: 1 tab(s) orally once a day (11 May 2025 14:16)  sildenafil 20 mg oral tablet: 2 tab(s) orally as directed (11 May 2025 14:16)  Visine Tears ophthalmic solution: 1 drop(s) in each eye 3 times a day as needed for  dry eyes (11 May 2025 14:16)  zinc (as acetate) 50 mg oral capsule: 1 cap(s) orally once a day (11 May 2025 14:16)    MEDICATIONS  (STANDING):  amLODIPine   Tablet 10 milliGRAM(s) Oral daily  artificial  tears Solution 1 Drop(s) Both EYES daily  aspirin enteric coated 81 milliGRAM(s) Oral daily  atenolol  Tablet 100 milliGRAM(s) Oral daily  doxycycline monohydrate Capsule 100 milliGRAM(s) Oral every 12 hours  enoxaparin Injectable 40 milliGRAM(s) SubCutaneous every 24 hours  melatonin 5 milliGRAM(s) Oral once  pantoprazole    Tablet 40 milliGRAM(s) Oral before breakfast  ranolazine 500 milliGRAM(s) Oral two times a day  rosuvastatin 20 milliGRAM(s) Oral at bedtime  sodium chloride 0.9%. 1000 milliLiter(s) (200 mL/Hr) IV Continuous <Continuous>    MEDICATIONS  (PRN):  acetaminophen     Tablet .. 650 milliGRAM(s) Oral once PRN Mild Pain (1 - 3)  acetaminophen     Tablet .. 650 milliGRAM(s) Oral every 6 hours PRN Temp greater or equal to 38C (100.4F), Mild Pain (1 - 3)  aluminum hydroxide/magnesium hydroxide/simethicone Suspension 30 milliLiter(s) Oral every 4 hours PRN Dyspepsia  melatonin 3 milliGRAM(s) Oral at bedtime PRN Insomnia  ondansetron Injectable 4 milliGRAM(s) IV Push every 8 hours PRN Nausea and/or Vomiting    Vital Signs Last 24 Hrs  T(C): 36.9 (12 May 2025 13:40), Max: 36.9 (12 May 2025 13:40)  T(F): 98.5 (12 May 2025 13:40), Max: 98.5 (12 May 2025 13:40)  HR: 67 (12 May 2025 16:00) (67 - 99)  BP: 115/76 (12 May 2025 16:00) (106/77 - 149/75)  BP(mean): --  RR: 16 (12 May 2025 16:00) (15 - 18)  SpO2: 99% (12 May 2025 16:00) (95% - 100%)    Parameters below as of 12 May 2025 16:15  Patient On (Oxygen Delivery Method): room air      I&O's Summary    12 May 2025 07:01  -  12 May 2025 16:33  --------------------------------------------------------  IN: 250 mL / OUT: 0 mL / NET: 250 mL      ________________________________  GENERAL APPEARANCE:  No acute distress  HEAD: normocephalic, atraumatic  NECK: supple, no jugular venous distention, no carotid bruit    HEART: Regular rate and rhythm, S1, S2 normal, 1/6 murmur    CHEST:  No anterior chest wall tenderness    LUNGS:  Clear to auscultation, without any wheezing, rhonchi or rales    ABDOMEN: soft, nontender, nondistended, with positive bowel sounds appreciated  EXTREMITIES: no edema.   NEURO: Alert and oriented x3  PSYC:  Normal affect  SKIN:  Dry  ________________________________   TELEMETRY: Sinus rhythm with PVCs    ECG: Sinus rhythm with no ST-T wave changes diagnostic of ischemia    LABS:                        13.6   9.65  )-----------( 305      ( 12 May 2025 06:36 )             40.2             05-12    141  |  109[H]  |  16  ----------------------------<  116[H]  3.9   |  27  |  1.04    Ca    9.1      12 May 2025 06:36    TPro  7.9  /  Alb  4.2  /  TBili  0.9  /  DBili  x   /  AST  38[H]  /  ALT  42  /  AlkPhos  58  05-11      LIVER FUNCTIONS - ( 11 May 2025 11:20 )  Alb: 4.2 g/dL / Pro: 7.9 gm/dL / ALK PHOS: 58 U/L / ALT: 42 U/L / AST: 38 U/L / GGT: x         PT/INR - ( 11 May 2025 11:20 )   PT: 11.6 sec;   INR: 1.01 ratio         PTT - ( 11 May 2025 11:20 )  PTT:29.1 sec  Urinalysis Basic - ( 12 May 2025 06:36 )    Color: x / Appearance: x / SG: x / pH: x  Gluc: 116 mg/dL / Ketone: x  / Bili: x / Urobili: x   Blood: x / Protein: x / Nitrite: x   Leuk Esterase: x / RBC: x / WBC x   Sq Epi: x / Non Sq Epi: x / Bacteria: x        PT/INR - ( 11 May 2025 11:20 )   PT: 11.6 sec;   INR: 1.01 ratio         PTT - ( 11 May 2025 11:20 )  PTT:29.1 sec  Urinalysis Basic - ( 12 May 2025 06:36 )    Color: x / Appearance: x / SG: x / pH: x  Gluc: 116 mg/dL / Ketone: x  / Bili: x / Urobili: x   Blood: x / Protein: x / Nitrite: x   Leuk Esterase: x / RBC: x / WBC x   Sq Epi: x / Non Sq Epi: x / Bacteria: x             ________________________________    RADIOLOGY & ADDITIONAL STUDIES:   IMPRESSION:  Unremarkable plain film examination of the chest    --- End of Report ---      TTE 5/12/25   1. Left ventricular cavity is normal in size. Left ventricular wall thickness is normal. Leftventricular systolic function is normal with an ejection fraction of 56 % by Jeronimo's method of disks with an ejection fraction visually estimated at 55 to 60 %.   2. Normal left ventricular diastolic function.   3. Normal right ventricular cavity size and normal right ventricular systolic function.   4. Normal left and right atrial size.   5. Mild mitral regurgitation.   6. Trace tricuspid regurgitation.   7. Estimated pulmonary artery systolic pressure is 12 mmHg, consistent with normal pulmonary artery pressure.   8. Trileaflet aortic valve with normal systolic excursion. There is mild thickening of the aortic valve leaflets.    ________________________________    ASSESSMENT:    Non-STEMI  History of multivessel CAD status post multiple coronary interventions-most recent last year  Hyperlipidemia  Hypertension  Hx of Lyme disease    PLAN:  In summary, this is a 70y Male with a past medical history of multivessel CAD, admitted with chest discomfort, concern for angina with elevated cardiac enzyme likely due to non-STEMI.  Given history of CAD, in conjunction with signs and symptoms, recommend coronary angiography today.  Patient agreeable.  Discussed with family.  Continue statin.  Prior lipids at goal.  Continue beta-blocker.        ____________________________________________  (Dragon Dictation software used). Thank you for allowing me to participate in the care of your patient. Please contact me should any questions arise.    TREVOR Davey DO, FACC  Office: [phone]

## 2025-05-12 NOTE — PROGRESS NOTE ADULT - TIME BILLING
- extensive review of patient's medical chart including prior hospital encounters, current admission progress notes, labs, imaging and other testing, seeing and evaluating patient at bedside, explaining to patient regarding current condition and plan of care, then ordering tests, coordinating care with consultants including Cardiology and Interventional Cardiology, and finally, documenting today's findings and plan.

## 2025-05-12 NOTE — BRIEF OPERATIVE NOTE - NSICDXBRIEFPREOP_GEN_ALL_CORE_FT
PRE-OP DIAGNOSIS:  NSTEMI (non-ST elevation myocardial infarction) 12-May-2025 16:23:10  Temitope Teresa

## 2025-05-13 ENCOUNTER — TRANSCRIPTION ENCOUNTER (OUTPATIENT)
Age: 70
End: 2025-05-13

## 2025-05-13 VITALS
HEART RATE: 61 BPM | OXYGEN SATURATION: 95 % | SYSTOLIC BLOOD PRESSURE: 133 MMHG | DIASTOLIC BLOOD PRESSURE: 77 MMHG | TEMPERATURE: 98 F | RESPIRATION RATE: 18 BRPM

## 2025-05-13 PROCEDURE — 99239 HOSP IP/OBS DSCHRG MGMT >30: CPT

## 2025-05-13 RX ORDER — CLOPIDOGREL BISULFATE 75 MG/1
1 TABLET, FILM COATED ORAL
Qty: 30 | Refills: 3
Start: 2025-05-13

## 2025-05-13 RX ORDER — RANOLAZINE 1000 MG/1
1 TABLET, FILM COATED, EXTENDED RELEASE ORAL
Qty: 60 | Refills: 0
Start: 2025-05-13

## 2025-05-13 RX ORDER — CLOPIDOGREL BISULFATE 75 MG/1
75 TABLET, FILM COATED ORAL ONCE
Refills: 0 | Status: COMPLETED | OUTPATIENT
Start: 2025-05-13 | End: 2025-05-13

## 2025-05-13 RX ORDER — ASPIRIN 325 MG
1 TABLET ORAL
Refills: 0 | DISCHARGE

## 2025-05-13 RX ADMIN — CLOPIDOGREL BISULFATE 75 MILLIGRAM(S): 75 TABLET, FILM COATED ORAL at 10:58

## 2025-05-13 RX ADMIN — RANOLAZINE 500 MILLIGRAM(S): 1000 TABLET, FILM COATED, EXTENDED RELEASE ORAL at 10:48

## 2025-05-13 RX ADMIN — Medication 100 MILLIGRAM(S): at 09:07

## 2025-05-13 RX ADMIN — Medication 81 MILLIGRAM(S): at 09:07

## 2025-05-13 RX ADMIN — AMLODIPINE BESYLATE 10 MILLIGRAM(S): 10 TABLET ORAL at 09:07

## 2025-05-13 NOTE — DISCHARGE NOTE PROVIDER - NSDCCPCAREPLAN_GEN_ALL_CORE_FT
PRINCIPAL DISCHARGE DIAGNOSIS  Diagnosis: Acute coronary syndrome  Assessment and Plan of Treatment: You presented with chest complaints, had mild elevated troponin. Underwent coronary angiogram (left heart cath) which showed patent stents, non obstructive coronary artery disease. Cardiology advised continued medical management with aspirin, statin, atenolol, addition of Ranexa. Outpatient follow up with Cardiology Dr James      SECONDARY DISCHARGE DIAGNOSES  Diagnosis: HTN (hypertension)  Assessment and Plan of Treatment: BP controlled. Continue atenolol and amlodipine    Diagnosis: Tick bite  Assessment and Plan of Treatment: Prior to admission, came in on doxycycline. Continue doxycycline as patient was doing prior to admission     PRINCIPAL DISCHARGE DIAGNOSIS  Diagnosis: Acute coronary syndrome  Assessment and Plan of Treatment: You presented with chest complaints, had mild elevated troponin. Underwent coronary angiogram (left heart cath) which showed patent stents, non obstructive coronary artery disease. Cardiology advised continued medical management with statin, atenolol, addition of Ranexa, switch from aspirin to Plavix (clopidogrel). Outpatient follow up with Cardiology Dr James      SECONDARY DISCHARGE DIAGNOSES  Diagnosis: HTN (hypertension)  Assessment and Plan of Treatment: BP controlled. Continue atenolol and amlodipine    Diagnosis: Tick bite  Assessment and Plan of Treatment: Prior to admission, came in on doxycycline. Continue doxycycline as patient was doing prior to admission

## 2025-05-13 NOTE — DISCHARGE NOTE PROVIDER - HOSPITAL COURSE
70 year old man with pre-diabetes, CAD, s/p multiple PCIs, last 3/2025, recent tick bite, currently on doxycycline, presented for further evaluation of chest discomfort when climbing steps. In ED was noted to have troponin 15 initially, increased to 275. EKG with nonspecific ST T changes. CXR clear. Patient was triaged to [address] for further evaluation.    NSTEMI  S/P [facility]. Patent stents, non obstructive CAD. Continue medical management with aspirin, statin, atenolol, addition of Ranexa. Outpatient follow up with Cardiology Dr James    HTN  BP controlled. Continue atenolol and amlodipine    Tick bite  Continue doxycycline as patient was doing prior to admission    Exam:  Afebrile  /77 HR 61 RR 18  O2 99% on RA  Gen: No distress  HEENT: NCAT PERRL EOMI MMM clear oropharynx  Neck: Supple, no JVD, no LAD  CVS: s1 s2 normal, RRR  Chest: Normal resp effort, lungs CTA B/L  Abd: Non distended, +BS, soft, non tender  Ext: No edema, no tenderness, intact peripheral pulses  Skin: Warm, dry  Mood: Calm, pleasant  Neuro: A+OX3, no deficits    Labs:                       13.6   9.65  )----------( 305                   40.2       141  |  109  |  16  ----------------------<  116  3.9   |   27   |  1.04    Ca    9.1        TPro  7.9  /  Alb  4.2  /  TBili  0.9  /  DBili  x   /  AST  38[H]  /  ALT  42  /  AlkPhos  58      PT: 11.6 sec;   INR: 1.01 ratio    PTT: 29.1 sec    Troponin 15-->278-->145    UA negative    Imaging:  CXR 5/11: Suboptimal degree of inspiration with bronchovascular crowding. There is no focal consolidation, pleural effusion or pneumothorax. The cardiomediastinal silhouette is within normal limits. Osseous structures are within normal limits.    Cardiac Testing:  C 5/12: Patent stents, non obstructive CAD    TTE 5/12: Left ventricular cavity is normal in size. Left ventricular wall thickness is normal. Leftventricular systolic function is normal with an ejection fraction of 56 % by Jeronimo's method of disks with an ejection fraction visually estimated at 55 to 60 %. Normal left ventricular diastolic function. Normal right ventricular cavity size and normal right ventricular systolic function. Normal left and right atrial size. Mild mitral regurgitation. Trace tricuspid regurgitation. Estimated pulmonary artery systolic pressure is 12 mmHg, consistent with normal pulmonary artery pressure. Trileaflet aortic valve with normal systolic excursion. There is mild thickening of the aortic valve leaflets.    EKG 5/11: Rate 82. NSR.    70 year old man with pre-diabetes, CAD, s/p multiple PCIs, last 3/2025, recent tick bite, currently on doxycycline, presented for further evaluation of chest discomfort when climbing steps. In ED was noted to have troponin 15 initially, increased to 275. EKG with nonspecific ST T changes. CXR clear. Patient was triaged to [address] for further evaluation.    NSTEMI  S/P [facility]. Patent stents, non obstructive CAD. Continue medical management with Plavix, statin, atenolol, addition of Ranexa. Outpatient follow up with Cardiology Dr James    HTN  BP controlled. Continue atenolol and amlodipine    Tick bite  Continue doxycycline as patient was doing prior to admission    Exam:  Afebrile  /77 HR 61 RR 18  O2 99% on RA  Gen: No distress  HEENT: NCAT PERRL EOMI MMM clear oropharynx  Neck: Supple, no JVD, no LAD  CVS: s1 s2 normal, RRR  Chest: Normal resp effort, lungs CTA B/L  Abd: Non distended, +BS, soft, non tender  Ext: No edema, no tenderness, intact peripheral pulses  Skin: Warm, dry  Mood: Calm, pleasant  Neuro: A+OX3, no deficits    Labs:                       13.6   9.65  )----------( 305                   40.2       141  |  109  |  16  ----------------------<  116  3.9   |   27   |  1.04    Ca    9.1        TPro  7.9  /  Alb  4.2  /  TBili  0.9  /  DBili  x   /  AST  38[H]  /  ALT  42  /  AlkPhos  58      PT: 11.6 sec;   INR: 1.01 ratio    PTT: 29.1 sec    Troponin 15-->278-->145    UA negative    Imaging:  CXR 5/11: Suboptimal degree of inspiration with bronchovascular crowding. There is no focal consolidation, pleural effusion or pneumothorax. The cardiomediastinal silhouette is within normal limits. Osseous structures are within normal limits.    Cardiac Testing:  C 5/12: Patent stents, non obstructive CAD    TTE 5/12: Left ventricular cavity is normal in size. Left ventricular wall thickness is normal. Leftventricular systolic function is normal with an ejection fraction of 56 % by Jeronimo's method of disks with an ejection fraction visually estimated at 55 to 60 %. Normal left ventricular diastolic function. Normal right ventricular cavity size and normal right ventricular systolic function. Normal left and right atrial size. Mild mitral regurgitation. Trace tricuspid regurgitation. Estimated pulmonary artery systolic pressure is 12 mmHg, consistent with normal pulmonary artery pressure. Trileaflet aortic valve with normal systolic excursion. There is mild thickening of the aortic valve leaflets.    EKG 5/11: Rate 82. NSR.    70 year old man with pre-diabetes, CAD, s/p multiple PCIs, last 3/2025, recent tick bite, currently on doxycycline, presented for further evaluation of chest discomfort when climbing steps. In ED was noted to have troponin 15 initially, increased to 275. EKG with nonspecific ST T changes. CXR clear. Patient was triaged to [address] for further evaluation.    NSTEMI  S/P [facility]. Nonobstructive coronary artery disease. Patent prior stents in the LAD, LCx, RCA.  Mild to moderate in-stent restenosis of OM1 stent. LV systolic function. Estimated EF 65%. Low LVEDP 3 mmHg. No aortic valve stenosis. Cardiology recommended management with medical therapy - Plavix, statin, atenolol, addition of Ranexa. Outpatient follow up with Cardiology Dr James    HTN  BP controlled. Continue atenolol and amlodipine    Tick bite  Continue doxycycline as patient was doing prior to admission    Exam:  Afebrile  /77 HR 61 RR 18  O2 99% on RA  Gen: No distress  HEENT: NCAT PERRL EOMI MMM clear oropharynx  Neck: Supple, no JVD, no LAD  CVS: s1 s2 normal, RRR  Chest: Normal resp effort, lungs CTA B/L  Abd: Non distended, +BS, soft, non tender  Ext: No edema, no tenderness, intact peripheral pulses  Skin: Warm, dry  Mood: Calm, pleasant  Neuro: A+OX3, no deficits    Labs:                       13.6   9.65  )----------( 305                   40.2       141  |  109  |  16  ----------------------<  116  3.9   |   27   |  1.04    Ca    9.1        TPro  7.9  /  Alb  4.2  /  TBili  0.9  /  DBili  x   /  AST  38[H]  /  ALT  42  /  AlkPhos  58      PT: 11.6 sec;   INR: 1.01 ratio    PTT: 29.1 sec    Troponin 15-->278-->145    UA negative    Imaging:  CXR 5/11: Suboptimal degree of inspiration with bronchovascular crowding. There is no focal consolidation, pleural effusion or pneumothorax. The cardiomediastinal silhouette is within normal limits. Osseous structures are within normal limits.    Cardiac Testing:  [facility] 5/12: Patent stents, non obstructive CAD    TTE 5/12: Left ventricular cavity is normal in size. Left ventricular wall thickness is normal. Leftventricular systolic function is normal with an ejection fraction of 56 % by Jeronimo's method of disks with an ejection fraction visually estimated at 55 to 60 %. Normal left ventricular diastolic function. Normal right ventricular cavity size and normal right ventricular systolic function. Normal left and right atrial size. Mild mitral regurgitation. Trace tricuspid regurgitation. Estimated pulmonary artery systolic pressure is 12 mmHg, consistent with normal pulmonary artery pressure. Trileaflet aortic valve with normal systolic excursion. There is mild thickening of the aortic valve leaflets.    EKG 5/11: Rate 82. NSR.

## 2025-05-13 NOTE — DISCHARGE NOTE PROVIDER - CARE PROVIDER_API CALL
Isaac James  Cardiovascular Disease  [address] [place] [address]  Phone: [phone]  Fax: [phone]  Follow Up Time: 2 weeks

## 2025-05-13 NOTE — DISCHARGE NOTE NURSING/CASE MANAGEMENT/SOCIAL WORK - FINANCIAL ASSISTANCE
[facility] provides services at a reduced cost to those who are determined to be eligible through [facility]’s financial assistance program. Information regarding [facility]’s financial assistance program can be found by going to [email or web address] or by calling [phone].

## 2025-05-13 NOTE — DISCHARGE NOTE PROVIDER - NSDCCAREPROVSEEN_GEN_ALL_CORE_FT
Raymond Warren (Cardiology)  Amber Batista (Cardiology)  Tiarra Davey (Cardiology)  Zuhair Cárdenas (Medicine)  Syed, Romana (Medicine)

## 2025-05-13 NOTE — DISCHARGE NOTE PROVIDER - OTHER REASON DETAILS
To be assessed as outpatient by primary cardiologist Patient to discuss with primary cardiologist as outpatient

## 2025-05-13 NOTE — PROGRESS NOTE ADULT - SUBJECTIVE AND OBJECTIVE BOX
The patient was seen and examined.   No acute events overnight.  No further chest pain.  No shortness of breath.  No palpitations.    Initial Consult HPI  ________________________________  TREVOR DAVALOS is a 70y year old Male with a past medical history of multivessel CAD dating back 10 years ago, with most recent PCI last year at [facility], recent history of Lyme disease, prediabetes, family history of ischemic heart disease, who presents for evaluation of chest discomfort and numbness in his hands, who ruled in for non-STEMI.  Prior to that, the patient exercise, without any cardiac symptoms.  This episode occurred while he was doing work in his yard.  Cardiac enzymes since trended-now trending down.  On my evaluation, chest pain-free.  On telemetry, EKG shows sinus rhythm PVCs.    ________________________________  Review of systems: A 10 point review of system has been performed, and is negative except for what has been mentioned in the above history of present illness.     PAST MEDICAL & SURGICAL HISTORY:  No pertinent past medical history      CAD (coronary artery disease)      S/P coronary artery stent placement      No significant past surgical history        FAMILY HISTORY:       SOCIAL HISTORY: The patient denies any tobacco abuse, alcohol abuse or illicit drug use.    ALLERGIES:  penicillin (Unknown)    Home Medications:  amLODIPine 10 mg oral tablet: 1 tab(s) orally once a day (11 May 2025 14:16)  aspirin 81 mg oral delayed release tablet: 1 tab(s) orally once a day (11 May 2025 14:16)  atenolol 100 mg oral tablet: 1 tab(s) orally once a day (11 May 2025 14:16)  cyclobenzaprine 10 mg oral tablet: 1 tab(s) orally once a day (at bedtime) as needed for  muscle spasm (11 May 2025 14:16)  doxycycline hyclate 100 mg oral tablet: 1 tab(s) orally 2 times a day x 7 days ***Course Not Complete*** (11 May 2025 14:16)  Fish Oil 1000 mg oral capsule: 1 cap(s) orally once a day (11 May 2025 14:16)  Multiple Vitamins oral tablet: 1 tab(s) orally once a day (11 May 2025 14:16)  rosuvastatin 20 mg oral tablet: 1 tab(s) orally once a day (11 May 2025 14:16)  sildenafil 20 mg oral tablet: 2 tab(s) orally as directed (11 May 2025 14:16)  Visine Tears ophthalmic solution: 1 drop(s) in each eye 3 times a day as needed for  dry eyes (11 May 2025 14:16)  zinc (as acetate) 50 mg oral capsule: 1 cap(s) orally once a day (11 May 2025 14:16)  MEDICATIONS  (STANDING):  amLODIPine   Tablet 10 milliGRAM(s) Oral daily  artificial  tears Solution 1 Drop(s) Both EYES daily  atenolol  Tablet 100 milliGRAM(s) Oral daily  doxycycline monohydrate Capsule 100 milliGRAM(s) Oral every 12 hours  melatonin 5 milliGRAM(s) Oral once  pantoprazole    Tablet 40 milliGRAM(s) Oral before breakfast  ranolazine 500 milliGRAM(s) Oral two times a day  rosuvastatin 20 milliGRAM(s) Oral at bedtime  sodium chloride 0.9%. 1000 milliLiter(s) (200 mL/Hr) IV Continuous <Continuous>    MEDICATIONS  (PRN):  acetaminophen     Tablet .. 650 milliGRAM(s) Oral once PRN Mild Pain (1 - 3)  acetaminophen     Tablet .. 650 milliGRAM(s) Oral every 6 hours PRN Temp greater or equal to 38C (100.4F), Mild Pain (1 - 3)  aluminum hydroxide/magnesium hydroxide/simethicone Suspension 30 milliLiter(s) Oral every 4 hours PRN Dyspepsia  melatonin 3 milliGRAM(s) Oral at bedtime PRN Insomnia  ondansetron Injectable 4 milliGRAM(s) IV Push every 8 hours PRN Nausea and/or Vomiting      Vital Signs Last 24 Hrs  T(C): 36.9 (13 May 2025 08:00), Max: 37.1 (13 May 2025 04:24)  T(F): 98.4 (13 May 2025 08:00), Max: 98.7 (13 May 2025 04:24)  HR: 61 (13 May 2025 08:00) (56 - 84)  BP: 133/77 (13 May 2025 08:00) (121/82 - 147/82)  BP(mean): --  RR: 18 (13 May 2025 08:00) (16 - 18)  SpO2: 95% (13 May 2025 08:00) (95% - 98%)    Parameters below as of 13 May 2025 08:00  Patient On (Oxygen Delivery Method): room air      I&O's Summary    12 May 2025 07:01  -  13 May 2025 07:00  --------------------------------------------------------  IN: 350 mL / OUT: 0 mL / NET: 350 mL      ________________________________  GENERAL APPEARANCE:  No acute distress  HEAD: normocephalic, atraumatic  NECK: supple, no jugular venous distention, no carotid bruit    HEART: Regular rate and rhythm, S1, S2 normal, 1/6 murmur    CHEST:  No anterior chest wall tenderness    LUNGS:  Clear to auscultation, without any wheezing, rhonchi or rales    ABDOMEN: soft, nontender, nondistended, with positive bowel sounds appreciated  EXTREMITIES: no edema.   NEURO: Alert and oriented x3  PSYC:  Normal affect  SKIN:  Dry  ________________________________   TELEMETRY: Sinus rhythm with PVCs    ECG: Sinus rhythm with no ST-T wave changes diagnostic of ischemia    LABS:                        13.6   9.65  )-----------( 305      ( 12 May 2025 06:36 )             40.2          05-12    141  |  109[H]  |  16  ----------------------------<  116[H]  3.9   |  27  |  1.04    Ca    9.1      12 May 2025 06:36            Urinalysis Basic - ( 12 May 2025 06:36 )    Color: x / Appearance: x / SG: x / pH: x  Gluc: 116 mg/dL / Ketone: x  / Bili: x / Urobili: x   Blood: x / Protein: x / Nitrite: x   Leuk Esterase: x / RBC: x / WBC x   Sq Epi: x / Non Sq Epi: x / Bacteria: x                 ________________________________    RADIOLOGY & ADDITIONAL STUDIES:   IMPRESSION:  Unremarkable plain film examination of the chest    --- End of Report ---      TTE 5/12/25   1. Left ventricular cavity is normal in size. Left ventricular wall thickness is normal. Leftventricular systolic function is normal with an ejection fraction of 56 % by Jeronimo's method of disks with an ejection fraction visually estimated at 55 to 60 %.   2. Normal left ventricular diastolic function.   3. Normal right ventricular cavity size and normal right ventricular systolic function.   4. Normal left and right atrial size.   5. Mild mitral regurgitation.   6. Trace tricuspid regurgitation.   7. Estimated pulmonary artery systolic pressure is 12 mmHg, consistent with normal pulmonary artery pressure.   8. Trileaflet aortic valve with normal systolic excursion. There is mild thickening of the aortic valve leaflets.    ________________________________    ASSESSMENT:    Non-STEMI  History of multivessel CAD status post multiple coronary interventions-most recent last year  Hyperlipidemia  Hypertension  Hx of Lyme disease    PLAN:  In summary, this is a 70y Male with a past medical history of multivessel CAD, admitted with chest discomfort, concern for angina with elevated cardiac enzyme likely due to non-STEMI.  Given history of CAD, in conjunction with signs and symptoms, recommend coronary angiography today.  Patient agreeable.  Discussed with family.  Continue statin.  Prior lipids at goal.  Continue beta-blocker.    5/13/25  s/p cath yest. Rec med tx - add ranexa  Add plavix - does not want ASA due to bleeding risk  Cont statin  Lp a  as out pt  Cont BB  ____________________________________________  (Dragon Dictation software used). Thank you for allowing me to participate in the care of your patient. Please contact me should any questions arise.    TREVOR Davey DO, FACC  Office: [phone]

## 2025-05-13 NOTE — DISCHARGE NOTE NURSING/CASE MANAGEMENT/SOCIAL WORK - NSDCPEFALRISK_GEN_ALL_CORE
For information on Fall & Injury Prevention, visit: [email or web address] OR  [email or web address] OR  https://www.cdc.gov/steadi/patient.html

## 2025-05-13 NOTE — DISCHARGE NOTE PROVIDER - NSDCMRMEDTOKEN_GEN_ALL_CORE_FT
amLODIPine 10 mg oral tablet: 1 tab(s) orally once a day  aspirin 81 mg oral delayed release tablet: 1 tab(s) orally once a day  atenolol 100 mg oral tablet: 1 tab(s) orally once a day  cyclobenzaprine 10 mg oral tablet: 1 tab(s) orally once a day (at bedtime) as needed for  muscle spasm  doxycycline hyclate 100 mg oral tablet: 1 tab(s) orally 2 times a day Complete course as you were previously taking  Fish Oil 1000 mg oral capsule: 1 cap(s) orally once a day  Multiple Vitamins oral tablet: 1 tab(s) orally once a day  ranolazine 500 mg oral tablet, extended release: 1 tab(s) orally 2 times a day  rosuvastatin 20 mg oral tablet: 1 tab(s) orally once a day  sildenafil 20 mg oral tablet: 2 tab(s) orally as directed  Visine Tears ophthalmic solution: 1 drop(s) in each eye 3 times a day as needed for  dry eyes  zinc (as acetate) 50 mg oral capsule: 1 cap(s) orally once a day   amLODIPine 10 mg oral tablet: 1 tab(s) orally once a day  atenolol 100 mg oral tablet: 1 tab(s) orally once a day  clopidogrel 75 mg oral tablet: 1 tab(s) orally once  cyclobenzaprine 10 mg oral tablet: 1 tab(s) orally once a day (at bedtime) as needed for  muscle spasm  doxycycline hyclate 100 mg oral tablet: 1 tab(s) orally 2 times a day Complete course as you were previously taking  Fish Oil 1000 mg oral capsule: 1 cap(s) orally once a day  Multiple Vitamins oral tablet: 1 tab(s) orally once a day  ranolazine 500 mg oral tablet, extended release: 1 tab(s) orally 2 times a day  rosuvastatin 20 mg oral tablet: 1 tab(s) orally once a day  sildenafil 20 mg oral tablet: 2 tab(s) orally as directed  Visine Tears ophthalmic solution: 1 drop(s) in each eye 3 times a day as needed for  dry eyes  zinc (as acetate) 50 mg oral capsule: 1 cap(s) orally once a day

## 2025-05-13 NOTE — DISCHARGE NOTE NURSING/CASE MANAGEMENT/SOCIAL WORK - PATIENT PORTAL LINK FT
You can access the FollowMyHealth Patient Portal offered by [facility] by registering at the following website: [email or web address]. By joining [facility]’s FollowMyHealth portal, you will also be able to view your health information using other applications (apps) compatible with our system.

## 2025-06-03 ENCOUNTER — TRANSCRIPTION ENCOUNTER (OUTPATIENT)
Age: 70
End: 2025-06-03

## 2025-07-09 NOTE — PATIENT PROFILE ADULT - FUNCTIONAL SCREEN CURRENT LEVEL: SWALLOWING (IF SCORE 2 OR MORE FOR ANY ITEM, CONSULT REHAB SERVICES), MLM)
Patient is asking for a call back once the prior authorization of the surgery on 10/14 is approved. I told the patient that prior authorization of the surgery has to start 30 days prior to the surgery date.  Thank you.     0 = swallows foods/liquids without difficulty